# Patient Record
Sex: FEMALE | Race: BLACK OR AFRICAN AMERICAN | NOT HISPANIC OR LATINO
[De-identification: names, ages, dates, MRNs, and addresses within clinical notes are randomized per-mention and may not be internally consistent; named-entity substitution may affect disease eponyms.]

---

## 2021-11-02 ENCOUNTER — APPOINTMENT (OUTPATIENT)
Dept: BREAST CENTER | Facility: CLINIC | Age: 31
End: 2021-11-02

## 2021-11-03 ENCOUNTER — APPOINTMENT (OUTPATIENT)
Dept: BREAST CENTER | Facility: CLINIC | Age: 31
End: 2021-11-03
Payer: MEDICAID

## 2021-11-03 VITALS
HEART RATE: 83 BPM | BODY MASS INDEX: 27.32 KG/M2 | DIASTOLIC BLOOD PRESSURE: 87 MMHG | WEIGHT: 170 LBS | OXYGEN SATURATION: 99 % | HEIGHT: 66 IN | SYSTOLIC BLOOD PRESSURE: 129 MMHG

## 2021-11-03 DIAGNOSIS — Z78.9 OTHER SPECIFIED HEALTH STATUS: ICD-10-CM

## 2021-11-03 DIAGNOSIS — Z80.3 FAMILY HISTORY OF MALIGNANT NEOPLASM OF BREAST: ICD-10-CM

## 2021-11-03 PROCEDURE — 99204 OFFICE O/P NEW MOD 45 MIN: CPT

## 2021-11-03 NOTE — PAST MEDICAL HISTORY
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 [FreeTextEntry6] : no [FreeTextEntry7] : yes (16 y/o) [FreeTextEntry8] : n/a

## 2021-11-03 NOTE — ASSESSMENT
[FreeTextEntry1] : 32 yo female presents for high risk screening in the setting of a family history of breast cancer; LOLY lifetime risk is 39.6%\par \par Reviewed the benefits of genetic testing in the setting of the patients family history. The patient is going to find out more information about her mom and sister, and find out if her sister had tested. She will be set up for an appointment with the genetic counselor. \par \par Discussed with the patient the implications for her lifetime risk, which is considered to be elevated for the development of breast cancer over the span of her lifetime. It was explained that it is recommended that they undergo high risk screening surveillance to include biannual radiological screening exams with a mammogram and screening MRI. She meets NCCN criteria to begin imaging now as her sister was diagnosed with breast cancer at age 33. Reviewed the standard lifestyle modifications for risk reduction including to limit alcohol intake, follow a low-fat diet, and maintain a healthy weight.\par \par Reviewed with the patient that mastalgia is not a common sign of breast cancer. I reviewed keeping a symptom calendar, the use of OTC Ibuprofen, decreasing caffeine intake (includes coffee, tea, chocolate, energy drinks, soda), and wearing a sports bra.\par \par Reviewed self breast exams with the patient. Recommended simple pattern of palpation, while seated and while laying down. Recommended that she performs these breast exams at the same time every month, as to time it with her cycle. Discussed characteristics of a suspicious mass, such as irregular, hard like a rock and fixed/immobile. Patient understands.

## 2021-11-03 NOTE — PHYSICAL EXAM
[Normocephalic] : normocephalic [Supple] : supple [No Supraclavicular Adenopathy] : no supraclavicular adenopathy [No Thyromegaly] : no thyromegaly [Examined in the supine and seated position] : examined in the supine and seated position [Symmetrical] : symmetrical [No dominant masses] : no dominant masses in right breast  [No dominant masses] : no dominant masses left breast [No Nipple Retraction] : no left nipple retraction [No Nipple Discharge] : no left nipple discharge [No Axillary Lymphadenopathy] : no left axillary lymphadenopathy [No Edema] : no edema [No Rashes] : no rashes [No Ulceration] : no ulceration [de-identified] : 1cm well circumscribed palpable mobile nodule at 12n2

## 2021-11-03 NOTE — REASON FOR VISIT
[Consultation] : a consultation visit [FreeTextEntry1] : breast ca screening, family history of premenopausal breast cancer

## 2021-11-03 NOTE — HISTORY OF PRESENT ILLNESS
[FreeTextEntry1] : 32 yo female referred by Dr. Avendano, presents for breast cancer screening in the setting of a family history of breast cancer. She reports intermittent mild breast pain. Pt denies palpable masses, skin lesions/changes, nipple discharge, or other breast complaints. \par \par LOLY lifetime risk is 39.6%; her mother (dx age 50) and her sister (dx age 33; her sister is now 40 years old) both had breast cancer; she is unsure if either of them have had genetic testing. Of note, the patient was born without a uterus and cervix; ovaries are intact.

## 2022-01-06 ENCOUNTER — NON-APPOINTMENT (OUTPATIENT)
Age: 32
End: 2022-01-06

## 2022-01-24 ENCOUNTER — NON-APPOINTMENT (OUTPATIENT)
Age: 32
End: 2022-01-24

## 2022-05-06 ENCOUNTER — APPOINTMENT (OUTPATIENT)
Dept: BREAST CENTER | Facility: CLINIC | Age: 32
End: 2022-05-06

## 2022-06-03 ENCOUNTER — NON-APPOINTMENT (OUTPATIENT)
Age: 32
End: 2022-06-03

## 2022-06-17 ENCOUNTER — APPOINTMENT (OUTPATIENT)
Dept: BREAST CENTER | Facility: CLINIC | Age: 32
End: 2022-06-17
Payer: MEDICAID

## 2022-06-17 VITALS
HEIGHT: 66 IN | SYSTOLIC BLOOD PRESSURE: 123 MMHG | BODY MASS INDEX: 25.55 KG/M2 | WEIGHT: 159 LBS | OXYGEN SATURATION: 97 % | HEART RATE: 80 BPM | DIASTOLIC BLOOD PRESSURE: 80 MMHG

## 2022-06-17 DIAGNOSIS — Z80.3 FAMILY HISTORY OF MALIGNANT NEOPLASM OF BREAST: ICD-10-CM

## 2022-06-17 PROCEDURE — 99213 OFFICE O/P EST LOW 20 MIN: CPT

## 2022-06-20 NOTE — PAST MEDICAL HISTORY
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 [FreeTextEntry6] : no [FreeTextEntry7] : yes (16 y/o) [FreeTextEntry8] : n/a

## 2022-06-20 NOTE — PHYSICAL EXAM
[Normocephalic] : normocephalic [Supple] : supple [No Supraclavicular Adenopathy] : no supraclavicular adenopathy [No Thyromegaly] : no thyromegaly [Examined in the supine and seated position] : examined in the supine and seated position [Symmetrical] : symmetrical [No dominant masses] : no dominant masses in right breast  [No dominant masses] : no dominant masses left breast [No Nipple Retraction] : no left nipple retraction [No Nipple Discharge] : no left nipple discharge [No Axillary Lymphadenopathy] : no left axillary lymphadenopathy [No Edema] : no edema [No Rashes] : no rashes [No Ulceration] : no ulceration [de-identified] : 1cm well circumscribed palpable mobile nodule at 12n2

## 2022-06-20 NOTE — HISTORY OF PRESENT ILLNESS
[FreeTextEntry1] : 31 yo female presents for breast cancer screening in the setting of a family history of breast cancer. She reports intermittent mild breast pain. Pt denies palpable masses, skin lesions/changes, nipple discharge, or other breast complaints. \par \par LOLY lifetime risk is 39.6%; her mother (dx age 50) and her sister (dx age 33; her sister is now 40 years old) both had breast cancer; she is unsure if either of them have had genetic testing. Of note, the patient was born without a uterus and cervix; ovaries are intact.

## 2022-06-20 NOTE — ASSESSMENT
[FreeTextEntry1] : 33 yo female presents for high risk screening in the setting of a family history of breast cancer; LOLY lifetime risk is 39.6%. \par \par Discussed with the patient the implications for her lifetime risk, which is considered to be elevated for the development of breast cancer over the span of her lifetime. It was explained that it is recommended that they undergo high risk screening surveillance to include biannual radiological screening exams with a mammogram and screening MRI. \par \par Reviewed the benefits of genetic testing in the setting of the patients family history, as well the implications of the various results; insurance coverage and pricing was also explained. It was decided to proceed with testing today.

## 2022-06-20 NOTE — DATA REVIEWED
[FreeTextEntry1] : 1/5/22 (R) b/l sMmg and US: Mammography and sonography demonstrate a 5 mm nodule in the right superolateral periareolar region, at 11:00. An ultrasound-guided needle core biopsy is recommended. The differential diagnosis includes fibroadenoma or papilloma, a malignancy is unlikely; however, the patient has a strong family history of breast carcinoma.  She will be having an MRI later today; the MRI will be dictated separately. BI-RADS 4. \par \par 1/5/22 ([facility]) MRI: 1. No findings suspicious for malignancy. Annual follow-up is recommended. If possible, the next MRI should be  from the mammogram and sonogram and performed on the six-month interval. I would recommend an MRI in January 2023, and the patient's next mammogram and sonogram can be performed in July 2023.\par 2. A mammogram and sonogram were performed today. A 5 mm solid-appearing nodule was seen in the right superolateral periareolar region. This nodule does not enhance on MRI but is seen on the T2-weighted sequence. It is likely a fibroadenoma or complex cyst; however, because it is not definitely benign on sonography, an ultrasound-guided needle core biopsy is recommended. The patient is aware of this recommendation. BI-RADS 2. \par \par 1/19/22 ([facility]) Right US bx: Right breast 11:00 periareolar. Nonproliferative breast changes characterized by cystic and papillary apocrine metaplasia, and stromal fibrosis. Pathology results indicate that the specimen is benign.The pathology results are concordant with the imaging.  A six-month follow up ultrasound is recommended.

## 2022-06-24 ENCOUNTER — NON-APPOINTMENT (OUTPATIENT)
Age: 32
End: 2022-06-24

## 2022-07-14 ENCOUNTER — NON-APPOINTMENT (OUTPATIENT)
Age: 32
End: 2022-07-14

## 2023-02-02 ENCOUNTER — NON-APPOINTMENT (OUTPATIENT)
Age: 33
End: 2023-02-02

## 2023-03-03 ENCOUNTER — APPOINTMENT (OUTPATIENT)
Dept: BREAST CENTER | Facility: CLINIC | Age: 33
End: 2023-03-03
Payer: MEDICAID

## 2023-03-03 VITALS
DIASTOLIC BLOOD PRESSURE: 80 MMHG | HEART RATE: 80 BPM | WEIGHT: 161 LBS | HEIGHT: 66 IN | SYSTOLIC BLOOD PRESSURE: 126 MMHG | BODY MASS INDEX: 25.88 KG/M2

## 2023-03-03 DIAGNOSIS — Z91.89 OTHER SPECIFIED PERSONAL RISK FACTORS, NOT ELSEWHERE CLASSIFIED: ICD-10-CM

## 2023-03-03 DIAGNOSIS — R92.8 OTHER ABNORMAL AND INCONCLUSIVE FINDINGS ON DIAGNOSTIC IMAGING OF BREAST: ICD-10-CM

## 2023-03-03 PROCEDURE — 99213 OFFICE O/P EST LOW 20 MIN: CPT

## 2023-03-03 NOTE — PHYSICAL EXAM
[Normocephalic] : normocephalic [Supple] : supple [No Supraclavicular Adenopathy] : no supraclavicular adenopathy [No Thyromegaly] : no thyromegaly [Examined in the supine and seated position] : examined in the supine and seated position [Symmetrical] : symmetrical [No dominant masses] : no dominant masses in right breast  [No dominant masses] : no dominant masses left breast [No Nipple Retraction] : no left nipple retraction [No Nipple Discharge] : no left nipple discharge [No Axillary Lymphadenopathy] : no left axillary lymphadenopathy [No Edema] : no edema [No Rashes] : no rashes [No Ulceration] : no ulceration [Breast Nipple Inversion] : nipples not inverted [Breast Nipple Retraction] : nipples not retracted [Breast Nipple Flattening] : nipples not flattened [Breast Nipple Fissures] : nipples not fissured

## 2023-03-03 NOTE — DATA REVIEWED
[FreeTextEntry1] : 1/5/22 (R) b/l sMmg and US: Mammography and sonography demonstrate a 5 mm nodule in the right superolateral periareolar region, at 11:00. An ultrasound-guided needle core biopsy is recommended. The differential diagnosis includes fibroadenoma or papilloma, a malignancy is unlikely; however, the patient has a strong family history of breast carcinoma.  She will be having an MRI later today; the MRI will be dictated separately. BI-RADS 4. \par \par 1/5/22 (R) MRI: 1. No findings suspicious for malignancy. Annual follow-up is recommended. If possible, the next MRI should be  from the mammogram and sonogram and performed on the six-month interval. I would recommend an MRI in January 2023, and the patient's next mammogram and sonogram can be performed in July 2023.\par 2. A mammogram and sonogram were performed today. A 5 mm solid-appearing nodule was seen in the right superolateral periareolar region. This nodule does not enhance on MRI but is seen on the T2-weighted sequence. It is likely a fibroadenoma or complex cyst; however, because it is not definitely benign on sonography, an ultrasound-guided needle core biopsy is recommended. The patient is aware of this recommendation. BI-RADS 2. \par \par 1/19/22 (R) Right US bx: Right breast 11:00 periareolar. Nonproliferative breast changes characterized by cystic and papillary apocrine metaplasia, and stromal fibrosis. Pathology results indicate that the specimen is benign.The pathology results are concordant with the imaging.  A six-month follow up ultrasound is recommended. \par \par 1/6/23 (R) MRI: No findings suspicious for malignancy. Annual follow-up is recommended. BI-RADS 1.\par \par 7/13/22 (R) right US: Right retroareolar breast 11:00 axis residual focus of fibrocystic change now measures 0.4 x 0.3 x 0.4 cm. Benign finding. Given sister with breast cancer at age 32 patient should have annual screening mammograms. Her screening mammogram will be due in January 2023. Benign. BI-RADS 2.

## 2023-03-03 NOTE — PAST MEDICAL HISTORY
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 [FreeTextEntry6] : no [FreeTextEntry7] : yes (18 y/o) [FreeTextEntry8] : n/a

## 2023-03-03 NOTE — HISTORY OF PRESENT ILLNESS
[FreeTextEntry1] : 34 yo female presents for breast cancer screening in the setting of a family history of breast cancer. She reports intermittent mild breast pain. Pt denies palpable masses, skin lesions/changes, nipple discharge, or other breast complaints. \par \par LOLY lifetime risk is 39.6%; her mother (dx age 50) and her sister (dx age 33; her sister is now 40 years old) both had breast cancer; she is unsure if either of them have had genetic testing. Of note, the patient was born without a uterus and cervix; ovaries are intact. Patient had genetic testing in 2022, 21 gene panel, negative results.

## 2023-03-03 NOTE — ASSESSMENT
[FreeTextEntry1] : 34 yo female presents for high risk screening in the setting of a family history of breast cancer; LOLY lifetime risk is 39.6%. Discussed with the patient the implications for her lifetime risk, which is considered to be elevated for the development of breast cancer over the span of her lifetime. It was explained that it is recommended that they undergo high risk screening surveillance to include biannual radiological screening exams with a mammogram and screening MRI. Given benign MRI in January, will push out mammo to July 2023 for optimal year round screening. The patient verbalized understanding and is in agreement with the plan.

## 2023-09-05 ENCOUNTER — NON-APPOINTMENT (OUTPATIENT)
Age: 33
End: 2023-09-05

## 2023-09-05 ENCOUNTER — APPOINTMENT (OUTPATIENT)
Dept: BREAST CENTER | Facility: CLINIC | Age: 33
End: 2023-09-05

## 2024-06-05 ENCOUNTER — NON-APPOINTMENT (OUTPATIENT)
Age: 34
End: 2024-06-05

## 2024-07-12 ENCOUNTER — APPOINTMENT (OUTPATIENT)
Dept: BREAST CENTER | Facility: CLINIC | Age: 34
End: 2024-07-12
Payer: COMMERCIAL

## 2024-07-12 VITALS
WEIGHT: 152 LBS | HEART RATE: 73 BPM | DIASTOLIC BLOOD PRESSURE: 82 MMHG | BODY MASS INDEX: 24.43 KG/M2 | SYSTOLIC BLOOD PRESSURE: 119 MMHG | HEIGHT: 66 IN

## 2024-07-12 DIAGNOSIS — Z91.89 OTHER SPECIFIED PERSONAL RISK FACTORS, NOT ELSEWHERE CLASSIFIED: ICD-10-CM

## 2024-07-12 DIAGNOSIS — Z80.3 FAMILY HISTORY OF MALIGNANT NEOPLASM OF BREAST: ICD-10-CM

## 2024-07-12 DIAGNOSIS — R92.30 DENSE BREASTS, UNSPECIFIED: ICD-10-CM

## 2024-07-12 PROCEDURE — 99213 OFFICE O/P EST LOW 20 MIN: CPT

## 2024-12-27 ENCOUNTER — NON-APPOINTMENT (OUTPATIENT)
Age: 34
End: 2024-12-27

## 2025-01-23 ENCOUNTER — NON-APPOINTMENT (OUTPATIENT)
Age: 35
End: 2025-01-23

## 2025-01-24 ENCOUNTER — APPOINTMENT (OUTPATIENT)
Dept: BREAST CENTER | Facility: CLINIC | Age: 35
End: 2025-01-24

## 2025-03-07 ENCOUNTER — NON-APPOINTMENT (OUTPATIENT)
Age: 35
End: 2025-03-07

## 2025-03-07 ENCOUNTER — APPOINTMENT (OUTPATIENT)
Dept: BREAST CENTER | Facility: CLINIC | Age: 35
End: 2025-03-07
Payer: COMMERCIAL

## 2025-03-07 VITALS — BODY MASS INDEX: 24.59 KG/M2 | HEIGHT: 66 IN | WEIGHT: 153 LBS

## 2025-03-07 DIAGNOSIS — R92.30 DENSE BREASTS, UNSPECIFIED: ICD-10-CM

## 2025-03-07 DIAGNOSIS — Z91.89 OTHER SPECIFIED PERSONAL RISK FACTORS, NOT ELSEWHERE CLASSIFIED: ICD-10-CM

## 2025-03-07 DIAGNOSIS — Z80.3 FAMILY HISTORY OF MALIGNANT NEOPLASM OF BREAST: ICD-10-CM

## 2025-03-07 PROCEDURE — 99213 OFFICE O/P EST LOW 20 MIN: CPT
